# Patient Record
Sex: MALE | Race: WHITE | Employment: OTHER | URBAN - METROPOLITAN AREA
[De-identification: names, ages, dates, MRNs, and addresses within clinical notes are randomized per-mention and may not be internally consistent; named-entity substitution may affect disease eponyms.]

---

## 2017-07-24 RX ORDER — METOPROLOL SUCCINATE 25 MG/1
TABLET, EXTENDED RELEASE ORAL
Qty: 90 TAB | Refills: 2 | Status: SHIPPED | OUTPATIENT
Start: 2017-07-24 | End: 2018-04-20 | Stop reason: SDUPTHER

## 2017-07-24 RX ORDER — RAMIPRIL 2.5 MG/1
CAPSULE ORAL
Qty: 90 CAP | Refills: 2 | Status: SHIPPED | OUTPATIENT
Start: 2017-07-24 | End: 2018-04-20 | Stop reason: SDUPTHER

## 2017-08-29 ENCOUNTER — TELEPHONE (OUTPATIENT)
Dept: CARDIOLOGY CLINIC | Age: 62
End: 2017-08-29

## 2017-08-29 DIAGNOSIS — E78.5 DYSLIPIDEMIA: Primary | ICD-10-CM

## 2017-08-29 DIAGNOSIS — I25.10 ATHEROSCLEROSIS OF NATIVE CORONARY ARTERY OF NATIVE HEART WITHOUT ANGINA PECTORIS: ICD-10-CM

## 2017-08-29 NOTE — TELEPHONE ENCOUNTER
Patient and his wife are calling in requesting labs. They state they haven't done it this year with PCP or anyone.

## 2017-09-27 RX ORDER — EZETIMIBE 10 MG/1
10 TABLET ORAL DAILY
Qty: 90 TAB | Refills: 3 | Status: SHIPPED | OUTPATIENT
Start: 2017-09-27 | End: 2017-10-12 | Stop reason: SDUPTHER

## 2017-10-11 RX ORDER — SIMVASTATIN 40 MG/1
TABLET, FILM COATED ORAL
Qty: 90 TAB | Refills: 3 | Status: SHIPPED | OUTPATIENT
Start: 2017-10-11 | End: 2017-10-11 | Stop reason: CLARIF

## 2017-10-11 NOTE — LETTER
10/11/2017 5:26 PM 
 
David Leeanna Blanton [place] 89 Dear Mr. Cook, We have been unable to reach you by phone in order to refill your medications. Please call our office at [phone] and schedule appointment to see us.  
 
 
Sincerely, 
 
 
Leslie Aguayo, DO

## 2017-10-11 NOTE — TELEPHONE ENCOUNTER
[facility] prescription for Zocor. According to our records patient has allergy to this medication and has been on Lipitor and Zetia instead. Last appointment with Dr Sheridan Gamble note \"His latest lipid profile, which was completed on 6/17/16, showed a total cholesterol of 142 with triglycerides of 116, HDL of 56, VLDL of 23 and LDL of 63, which is fairly good control of his cholesterol, but still not optimal.  In view of his high calcium score, I think we should try to get his LDLs down into the 40s or 50s and therefore I am going to add Zetia 10 mg to his regimen and actually back off his Lipitor to 40 mg a day and then repeat his lipid profile in two months. \"    Patient should be on Zetia 10 mg and and Lipitor 40mg. No other adjustments since then in our office. Called in [facility] in order to figure out who initiated simvastatin refill. They said it was their mistake. They will d/c simvastatin. Their direct phone [phone].

## 2017-10-12 RX ORDER — ATORVASTATIN CALCIUM 40 MG/1
40 TABLET, FILM COATED ORAL DAILY
Qty: 90 TAB | Refills: 3 | Status: SHIPPED | OUTPATIENT
Start: 2017-10-12 | End: 2018-09-25 | Stop reason: SDUPTHER

## 2017-10-12 RX ORDER — EZETIMIBE 10 MG/1
10 TABLET ORAL DAILY
Qty: 90 TAB | Refills: 3 | Status: SHIPPED | OUTPATIENT
Start: 2017-10-12 | End: 2018-10-26 | Stop reason: SDUPTHER

## 2017-10-12 NOTE — TELEPHONE ENCOUNTER
PAtient called in. He received a text from Dr Bonnita Kussmaul yesterday. Patient has been taking Simvastatin 40 mg along with Zetia 10 mg all this time. Dr Liliana Triplett clearly states  \"   His latest lipid profile, which was completed on 6/17/16, showed a total cholesterol of 142 with triglycerides of 116, HDL of 56, VLDL of 23 and LDL of 63, which is fairly good control of his cholesterol, but still not optimal.  In view of his high calcium score, I think we should try to get his LDLs down into the 40s or 50s and therefore I am going to add Zetia 10 mg to his regimen and actually back off his Lipitor to 40 mg a day and then repeat his lipid profile in two months. \"       Advised patient to switch to Atorvastatin 40 mg when [facility] will [facility](will refill today), especially  In lieu of him having myalgias and being fatigued on it( we have documented allergy to zocor). He will do labs prior to seeing dr Bonnita Kussmaul.

## 2017-11-14 LAB
ALBUMIN SERPL-MCNC: 4.3 G/DL (ref 3.6–4.8)
ALP SERPL-CCNC: 120 IU/L (ref 39–117)
ALT SERPL-CCNC: 20 IU/L (ref 0–44)
AST SERPL-CCNC: 25 IU/L (ref 0–40)
BILIRUB DIRECT SERPL-MCNC: 0.08 MG/DL (ref 0–0.4)
BILIRUB SERPL-MCNC: 0.3 MG/DL (ref 0–1.2)
BUN SERPL-MCNC: 20 MG/DL (ref 8–27)
BUN/CREAT SERPL: 18 (ref 10–24)
CALCIUM SERPL-MCNC: 9.6 MG/DL (ref 8.6–10.2)
CHLORIDE SERPL-SCNC: 104 MMOL/L (ref 96–106)
CHOLEST SERPL-MCNC: 125 MG/DL (ref 100–199)
CO2 SERPL-SCNC: 25 MMOL/L (ref 18–29)
CREAT SERPL-MCNC: 1.09 MG/DL (ref 0.76–1.27)
GFR SERPLBLD CREATININE-BSD FMLA CKD-EPI: 73 ML/MIN/1.73
GFR SERPLBLD CREATININE-BSD FMLA CKD-EPI: 84 ML/MIN/1.73
GLUCOSE SERPL-MCNC: 102 MG/DL (ref 65–99)
HDLC SERPL-MCNC: 48 MG/DL
INTERPRETATION, 910389: NORMAL
LDLC SERPL CALC-MCNC: 61 MG/DL (ref 0–99)
POTASSIUM SERPL-SCNC: 5.8 MMOL/L (ref 3.5–5.2)
PROT SERPL-MCNC: 6.9 G/DL (ref 6–8.5)
SODIUM SERPL-SCNC: 145 MMOL/L (ref 134–144)
TRIGL SERPL-MCNC: 78 MG/DL (ref 0–149)
VLDLC SERPL CALC-MCNC: 16 MG/DL (ref 5–40)

## 2017-11-14 NOTE — PROGRESS NOTES
Per your last note \" His latest lipid profile, which was completed on 6/17/16, showed a total cholesterol of 142 with triglycerides of 116, HDL of 56, VLDL of 23 and LDL of 63, which is fairly good control of his cholesterol, but still not optimal.  In view of his high calcium score, I think we should try to get his LDLs down into the 40s or 50s and therefore I am going to add Zetia 10 mg to his regimen and actually back off his Lipitor to 40 mg a day and then repeat his lipid profile in two months. We will continue to adjust his lipid lowering medications moving forward to try to get his LDLs down into the 40 or 50 range, which I think would be optimal for the long term. I also asked him to start coenzyme Q10 200 mg a day just because he is on a statin and because we know that coenzyme Q10 is decreased by statin therapy to hopefully avoid development of any side effects moving forward, and I will simply plan to see him again in a year or as needed if new symptoms surface, or  obviously more quickly if he has a significantly abnormal nuclear myocardial perfusion study.    Please take a look at potassium , K is 5.8, patient is on altace

## 2017-11-15 NOTE — PROGRESS NOTES
This could, of course, be a hemo lysed specimen. I would repeat in the next couple of days and if still elevated then DC ACE. If patient can't get it repeated quickly then I would just have to stop the ACE now and repeat the BMP in a few weeks and assume the specimen was not hemo lysed and the hyperkalemia is indeed secondary to the ACE and alex his chart accordingly.  ES

## 2017-11-16 ENCOUNTER — TELEPHONE (OUTPATIENT)
Dept: CARDIOLOGY CLINIC | Age: 62
End: 2017-11-16

## 2017-11-16 DIAGNOSIS — E78.5 DYSLIPIDEMIA: Primary | ICD-10-CM

## 2017-11-16 DIAGNOSIS — I25.10 ATHEROSCLEROSIS OF NATIVE CORONARY ARTERY OF NATIVE HEART WITHOUT ANGINA PECTORIS: ICD-10-CM

## 2017-11-16 NOTE — TELEPHONE ENCOUNTER
----- Message from Bassam Barclay DO sent at 11/15/2017 12:38 PM EST -----  This could, of course, be a hemo lysed specimen. I would repeat in the next couple of days and if still elevated then DC ACE. If patient can't get it repeated quickly then I would just have to stop the ACE now and repeat the BMP in a few weeks and assume the specimen was not hemo lysed and the hyperkalemia is indeed secondary to the ACE and alex his chart accordingly.  ES

## 2017-11-21 ENCOUNTER — OFFICE VISIT (OUTPATIENT)
Dept: CARDIOLOGY CLINIC | Age: 62
End: 2017-11-21

## 2017-11-21 VITALS
WEIGHT: 169 LBS | HEIGHT: 70 IN | HEART RATE: 58 BPM | BODY MASS INDEX: 24.2 KG/M2 | OXYGEN SATURATION: 98 % | DIASTOLIC BLOOD PRESSURE: 78 MMHG | SYSTOLIC BLOOD PRESSURE: 118 MMHG

## 2017-11-21 DIAGNOSIS — I25.10 ATHEROSCLEROSIS OF NATIVE CORONARY ARTERY OF NATIVE HEART WITHOUT ANGINA PECTORIS: Primary | ICD-10-CM

## 2017-11-21 DIAGNOSIS — E78.5 DYSLIPIDEMIA: ICD-10-CM

## 2017-11-21 RX ORDER — SIMVASTATIN 40 MG/1
TABLET, FILM COATED ORAL
COMMUNITY
End: 2019-09-24 | Stop reason: ALTCHOICE

## 2017-11-21 NOTE — PROGRESS NOTES
1. Have you been to the ER, urgent care clinic since your last visit? Hospitalized since your last visit?no    2. Have you seen or consulted any other health care providers outside of the [address] [place] [address] since your last visit? Include any pap smears or colon screening.  no

## 2017-11-21 NOTE — PROGRESS NOTES
Review of Systems   Constitutional: Negative for chills, fever, malaise/fatigue and weight loss. Respiratory: Negative for cough, hemoptysis, shortness of breath and wheezing. Cardiovascular: Negative for chest pain, palpitations, orthopnea and leg swelling. Gastrointestinal: Negative. Musculoskeletal: Positive for falls and joint pain. Negative for myalgias. Neurological: Negative for dizziness.

## 2017-11-21 NOTE — MR AVS SNAPSHOT
Visit Information Date & Time Provider Department Dept. Phone Encounter #  
 11/21/2017  9:20 AM Dave Simon DO [facility] [address] [id number] Follow-up Instructions Return in about 1 year (around 11/21/2018), or if symptoms worsen or fail to improve. Upcoming Health Maintenance Date Due Hepatitis C Screening 1955 DTaP/Tdap/Td series (1 - Tdap) 4/16/1977 FOBT Q 1 YEAR AGE 50-75 4/16/2006 ZOSTER VACCINE AGE 60> 2/16/2016 Influenza Age 5 to Adult 8/1/2017 Allergies as of 11/21/2017  Review Complete On: 11/21/2017 By: Dave Simon DO Severity Noted Reaction Type Reactions Celebrex [Celecoxib]  04/10/2013    Rash Crestor [Rosuvastatin]  06/12/2013    Myalgia Zocor [Simvastatin]  06/12/2013    Myalgia Current Immunizations  Never Reviewed No immunizations on file. Not reviewed this visit You Were Diagnosed With   
  
 Codes Comments Atherosclerosis of native coronary artery of native heart without angina pectoris    -  Primary ICD-10-CM: I25.10 ICD-9-CM: 414.01 Dyslipidemia     ICD-10-CM: E78.5 ICD-9-CM: 272.4 Vitals BP Pulse Height(growth percentile) Weight(growth percentile) SpO2 BMI  
 118/78 (!) 58 5' 10\" (1.778 m) 169 lb (76.7 kg) 98% 24.25 kg/m2 Smoking Status Never Smoker Vitals History BMI and BSA Data Body Mass Index Body Surface Area  
 24.25 kg/m 2 1.95 m 2 Preferred Pharmacy Pharmacy Name Phone Danielle Bermudez [facility] [phone] Your Updated Medication List  
  
   
This list is accurate as of: 11/21/17 10:16 AM.  Always use your most recent med list.  
  
  
  
  
 aspirin 81 mg tablet Take 81 mg by mouth daily. atorvastatin 40 mg tablet Commonly known as:  LIPITOR Take 1 Tab by mouth daily. ezetimibe 10 mg tablet Commonly known as:  Haseeb Lamprey Take 1 Tab by mouth daily. metoprolol succinate 25 mg XL tablet Commonly known as:  TOPROL-XL  
TAKE 1 TABLET DAILY  
  
 ramipril 2.5 mg capsule Commonly known as:  ALTACE  
TAKE 1 CAPSULE DAILY ZOCOR 40 mg tablet Generic drug:  simvastatin Take  by mouth nightly. We Performed the Following AMB POC EKG ROUTINE W/ 12 LEADS, INTER & REP [13524 CPT(R)] Follow-up Instructions Return in about 1 year (around 11/21/2018), or if symptoms worsen or fail to improve. Introducing [facility]! Dear Neetu Pacheco: Thank you for requesting a [employer] account. Our records indicate that you already have an active [employer] account. You can access your account anytime at [email or web address] Did you know that you can access your hospital and ER discharge instructions at any time in [employer]? You can also review all of your test results from your hospital stay or ER visit. Additional Information If you have questions, please visit the Frequently Asked Questions section of the [employer] website at [email or web address] Remember, [employer] is NOT to be used for urgent needs. For medical emergencies, dial 911. Now available from your iPhone and Android! Please provide this summary of care documentation to your next provider. If you have any questions after today's visit, please call [phone].

## 2017-11-21 NOTE — PROGRESS NOTES
HPI: I saw Judy Sifuentes Joyce in my office today in cardiovascular evaluation regarding his known coronary artery disease and dyslipidemia. 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 He did have a cardiac catheterization back in April of 2013, which demonstrated mild coronary artery disease with 20-40% narrowings in the LAD and circumflex and 50% obstruction in the distal portion of the dominant right coronary artery. He had a coronary calcium score on July 11, 2016 which was 871 and certainly suggests that he has fairly aggressive atherosclerosis and we did this to see how aggressive we should be with statin therapy. When I saw him last here in the office on August 3, 2016 he was complaining of some fatigue and we ultimately did a nuclear myocardial perfusion study to be sure that there was not any signs of ischemia as the etiology of his fatigue which was completed on August 22, 2016 and appear to be completely within normal limits with normal perfusion of the heart without signs of ischemia or infarction and normal left trigger function with ejection fraction of 63% on gated SPECT imaging. He comes into the office today for the first time in over a year for cardiovascular evaluation prior to a planned shoulder arthroscopic procedure to be completed by Dr. Cecil Olivera on January 17, 2018. He relates to me that he still has some problems with fatigue, but he works for a long hours as an orthodontist and his fatigue issues are not worsening and I do not feel that they his fatigue is related to obstructive coronary artery disease. .    Encounter Diagnoses   Name Primary?  Atherosclerosis of native coronary artery of native heart without angina pectoris Yes    Dyslipidemia        Discussion:  This gentleman appears to be doing about as well as we could expect and I really have no recommendations for change at this time. He apparently fell out of a tree and traumatized his shoulder and broke 5 ribs in the middle of October and now is having a lot more problems with his chronic shoulder issues for which surgery is being contemplated as indicated above and I do not feel he should be at any significant increased cardiovascular risk at the time of that planned surgery. His lipid profile which was completed on October 13, 2017 showed total cholesterol 125 with triglycerides of 78, HDL of 48, VLDL of 16, and LDL of 61 which I think is good control on his current Lipitor 80 mg daily and I would simply recommend continuing the Lipitor as she is currently taking it. His blood pressure is well-controlled today and his EKG is stable so I am simply going to plan to see him again in several months to year or as needed if any new cardiovascular symptoms surface in the interim. PCP:  No primary care provider on file. Past Medical History:   Diagnosis Date    Abnormal stress echo 04/24/2013    Abnormal maximal stress echo. Echo findings positive for stress-induced ischemia, suggesting CAD in the LAD. EF 65%. Mod apical hypk. Neg EKG. Ex time 13 min.  Agatston CAC score, >400 07/11/2016    Coronary calcium score 615.  Aorto-iliac duplex 05/24/2013    No AAA identified. No visible plaque noted.  Dyslipidemia     S/P cardiac cath 06/28/2013    dRCA 50%. o/pPDA 50%. LM patent. CX 25%. pLAD 35%. LVEDP 14.  EF 65%. No WMA.          Past Surgical History:   Procedure Laterality Date    HX ACL RECONSTRUCTION      HX FREE SKIN GRAFT  3/2013    basal cell carcinoma    HX HEART CATHETERIZATION  6/28/2013       Current Outpatient Rx   Name  Route  Sig  Dispense  Refill    atorvastatin (LIPITOR) 80 mg tablet        TAKE 1 TABLET DAILY    90 Tab    2      metoprolol succinate (TOPROL-XL) 25 mg XL tablet        TAKE 1 TABLET BY MOUTH DAILY    90 Tab    3      ramipril (ALTACE) 2.5 mg capsule        TAKE ONE CAPSULE BY MOUTH DAILY    90 Cap    3      aspirin 81 mg tablet    Oral    Take 81 mg by mouth daily. Allergies   Allergen Reactions    Celebrex [Celecoxib] Rash    Crestor [Rosuvastatin] Myalgia    Zocor [Simvastatin] Myalgia       Social History :  Social History   Substance Use Topics    Smoking status: Never Smoker    Smokeless tobacco: Former User     Quit date: 1/1/1985    Alcohol use Yes      Comment: occassionally        Family History: family history includes Coronary Artery Disease in his maternal uncle; Heart Attack in his father and maternal uncle; Heart Surgery in his maternal uncle; Other in his mother. Review of Systems:   Constitutional: Negative for chills, fever, malaise/fatigue and weight loss. Respiratory: Negative for cough, hemoptysis, shortness of breath and wheezing. Cardiovascular: Negative for chest pain, palpitations, orthopnea and leg swelling. Gastrointestinal: Negative. Musculoskeletal: Positive for falls and joint pain. Negative for myalgias. Neurological: Negative for dizziness. Physical Exam:    The patient is a cooperative, alert, well developed, well nourished 65 y.o.  male who is in no acute distress at the time of the examination. Visit Vitals    /78    Pulse (!) 58    Ht 5' 10\" (1.778 m)    Wt 76.7 kg (169 lb)    SpO2 98%    BMI 24.25 kg/m2     HEENT: Conjuctiva white, mucosa moist, no pallor or cyanosis. NECK: Supple without masses, tenderness or thyromegaly. There was no jugular venous distention. Carotid are full bilaterally without bruits. CHEST: Symmetrical with good excursion. LUNGS: Clear to auscultation in all fields. HEART: The apex is not displaced. There were no lifts, thrills or heaves. There is a normal S1 and S2 without appreciable murmurs, rubs, clicks, or gallops auscultated.   ABDOMEN: Soft without masses, tenderness or organomegaly. EXTREMITIES: Full peripheral pulses without peripheral edema. Review of Data: See PMH and Cardiology and Imaging sections for cardiac testing  Lab Results   Component Value Date/Time    Cholesterol, total 125 11/13/2017 08:19 AM    HDL Cholesterol 48 11/13/2017 08:19 AM    LDL, calculated 61 11/13/2017 08:19 AM    Triglyceride 78 11/13/2017 08:19 AM    CHOL/HDL Ratio 3.8 09/20/2010 10:34 AM       Results for orders placed or performed in visit on 11/21/17   AMB POC EKG ROUTINE W/ 12 LEADS, INTER & REP     Status: None    Narrative    Sinus bradycardia, rate 58. This EKG is within normal limits and similar to the EKG of August 3, 2016. Brandon Wright D.O., F.A.C.C. [facility]  [facility] [place]  [facility] [address]    PLEASE NOTE:  This document has been produced using voice recognition software. Unrecognized errors in transcription may be present.

## 2017-11-22 LAB
ALBUMIN SERPL-MCNC: 4.2 G/DL (ref 3.6–4.8)
ALP SERPL-CCNC: 99 IU/L (ref 39–117)
ALT SERPL-CCNC: 26 IU/L (ref 0–44)
AST SERPL-CCNC: 30 IU/L (ref 0–40)
BILIRUB DIRECT SERPL-MCNC: 0.1 MG/DL (ref 0–0.4)
BILIRUB SERPL-MCNC: 0.3 MG/DL (ref 0–1.2)
BUN SERPL-MCNC: 19 MG/DL (ref 8–27)
BUN/CREAT SERPL: 19 (ref 10–24)
CALCIUM SERPL-MCNC: 9.6 MG/DL (ref 8.6–10.2)
CHLORIDE SERPL-SCNC: 102 MMOL/L (ref 96–106)
CHOLEST SERPL-MCNC: 149 MG/DL (ref 100–199)
CO2 SERPL-SCNC: 25 MMOL/L (ref 18–29)
CREAT SERPL-MCNC: 1.02 MG/DL (ref 0.76–1.27)
GFR SERPLBLD CREATININE-BSD FMLA CKD-EPI: 79 ML/MIN/1.73
GFR SERPLBLD CREATININE-BSD FMLA CKD-EPI: 91 ML/MIN/1.73
GLUCOSE SERPL-MCNC: 91 MG/DL (ref 65–99)
HDLC SERPL-MCNC: 43 MG/DL
INTERPRETATION, 910389: NORMAL
LDLC SERPL CALC-MCNC: 58 MG/DL (ref 0–99)
POTASSIUM SERPL-SCNC: 5.1 MMOL/L (ref 3.5–5.2)
PROT SERPL-MCNC: 6.6 G/DL (ref 6–8.5)
SODIUM SERPL-SCNC: 142 MMOL/L (ref 134–144)
TRIGL SERPL-MCNC: 242 MG/DL (ref 0–149)
VLDLC SERPL CALC-MCNC: 48 MG/DL (ref 5–40)

## 2017-11-29 NOTE — PROGRESS NOTES
I am not sure I ordered a BMP, but may have ordered the lipid profile which is somewhat abnormal and certainly a lot worse than 2 weeks ago which does not really make any sense so I question if this was a correct sample since the one 2 weeks ago looked fine. I really do not think there is anything to do unless there is something else in my note that suggest that I was going to make any changes.  ES

## 2018-04-20 RX ORDER — RAMIPRIL 2.5 MG/1
CAPSULE ORAL
Qty: 90 CAP | Refills: 2 | Status: SHIPPED | OUTPATIENT
Start: 2018-04-20 | End: 2018-10-26 | Stop reason: SDUPTHER

## 2018-04-20 RX ORDER — METOPROLOL SUCCINATE 25 MG/1
TABLET, EXTENDED RELEASE ORAL
Qty: 90 TAB | Refills: 2 | Status: SHIPPED | OUTPATIENT
Start: 2018-04-20 | End: 2018-10-26 | Stop reason: SDUPTHER

## 2018-09-26 RX ORDER — ATORVASTATIN CALCIUM 40 MG/1
40 TABLET, FILM COATED ORAL DAILY
Qty: 90 TAB | Refills: 3 | Status: SHIPPED | OUTPATIENT
Start: 2018-09-26 | End: 2019-09-23 | Stop reason: SDUPTHER

## 2018-10-27 RX ORDER — RAMIPRIL 2.5 MG/1
CAPSULE ORAL
Qty: 90 CAP | Refills: 2 | Status: SHIPPED | OUTPATIENT
Start: 2018-10-27 | End: 2019-11-25 | Stop reason: SDUPTHER

## 2018-10-27 RX ORDER — METOPROLOL SUCCINATE 25 MG/1
TABLET, EXTENDED RELEASE ORAL
Qty: 90 TAB | Refills: 2 | Status: SHIPPED | OUTPATIENT
Start: 2018-10-27 | End: 2019-09-23 | Stop reason: SDUPTHER

## 2018-10-27 RX ORDER — EZETIMIBE 10 MG/1
10 TABLET ORAL DAILY
Qty: 90 TAB | Refills: 3 | Status: SHIPPED | OUTPATIENT
Start: 2018-10-27 | End: 2019-05-21 | Stop reason: SDUPTHER

## 2019-03-06 ENCOUNTER — OFFICE VISIT (OUTPATIENT)
Dept: CARDIOLOGY CLINIC | Age: 64
End: 2019-03-06

## 2019-03-06 VITALS
OXYGEN SATURATION: 96 % | HEIGHT: 70 IN | WEIGHT: 171 LBS | HEART RATE: 65 BPM | DIASTOLIC BLOOD PRESSURE: 78 MMHG | BODY MASS INDEX: 24.48 KG/M2 | SYSTOLIC BLOOD PRESSURE: 116 MMHG

## 2019-03-06 DIAGNOSIS — E78.5 DYSLIPIDEMIA: Primary | ICD-10-CM

## 2019-03-06 DIAGNOSIS — I25.10 ATHEROSCLEROSIS OF NATIVE CORONARY ARTERY OF NATIVE HEART WITHOUT ANGINA PECTORIS: ICD-10-CM

## 2019-03-06 NOTE — PROGRESS NOTES
HPI:  I saw Yanet Cook in my office today in cardiovascular evaluation regarding his known coronary artery disease and dyslipidemia.  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 Terrie Suarez did have a cardiac catheterization back in 2013, which demonstrated mild coronary artery disease with 20-40% narrowings in the LAD and circumflex and 50% obstruction in the distal portion of the dominant right coronary artery.  He had a coronary calcium score on 2016 which was 325 and certainly suggests that he has fairly aggressive atherosclerosis and we did this to see how aggressive we should be with statin therapy.     His last nuclear myocardial perfusion study which was done to be sure that there were not any signs of ischemia as the etiology of his fatigue was completed on 2016 and appear to be completely within normal limits with normal perfusion of the heart without signs of ischemia or infarction and normal left trigger function with ejection fraction of 63% on gated SPECT imaging.     He comes in today and relates that he is doing quite well. He is staying fairly active and denies any cardiovascular symptomatology at this time. Encounter Diagnoses   Name Primary?  Atherosclerosis of native coronary artery of native heart without angina pectoris     Dyslipidemia Yes       Discussion: This patient appears to be doing about as well as we could expect and I really have no recommendations for change at this time. He is not having any symptoms to suggest the development of symptomatic obstructive coronary disease or any signs of heart failure.     I do not have a recent lipid profile on him which I am going to get completed but historically his total non-HDL cholesterol has been a little over 100 when he was on Lipitor and hopefully will be better on a combination of Lipitor and Zetia. His blood pressure is well controlled today and his EKG appears to be stable and since he otherwise seems to be doing well from my advantage I will simply plan to see him again in a year. PCP:  Katya Glass MD      Past Medical History:   Diagnosis Date    Abnormal stress echo 04/24/2013    Abnormal maximal stress echo. Echo findings positive for stress-induced ischemia, suggesting CAD in the LAD. EF 65%. Mod apical hypk. Neg EKG. Ex time 13 min.  Agatston CAC score, >400 07/11/2016    Coronary calcium score 615.  Aorto-iliac duplex 05/24/2013    No AAA identified. No visible plaque noted.  Dyslipidemia     S/P cardiac cath 06/28/2013    dRCA 50%. o/pPDA 50%. LM patent. CX 25%. pLAD 35%. LVEDP 14.  EF 65%. No WMA. Past Surgical History:   Procedure Laterality Date    HX ACL RECONSTRUCTION      HX FREE SKIN GRAFT  3/2013    basal cell carcinoma    HX HEART CATHETERIZATION  6/28/2013       Current Outpatient Medications   Medication Sig    metoprolol succinate (TOPROL-XL) 25 mg XL tablet TAKE 1 TABLET DAILY    ezetimibe (ZETIA) 10 mg tablet Take 1 Tab by mouth daily.  ramipril (ALTACE) 2.5 mg capsule TAKE 1 CAPSULE DAILY    atorvastatin (LIPITOR) 40 mg tablet Take 1 Tab by mouth daily.  simvastatin (ZOCOR) 40 mg tablet Take  by mouth nightly.  aspirin 81 mg tablet Take 81 mg by mouth daily. No current facility-administered medications for this visit. Allergies   Allergen Reactions    Celebrex [Celecoxib] Rash    Crestor [Rosuvastatin] Myalgia    Zocor [Simvastatin] Myalgia       Social History :  Social History     Tobacco Use    Smoking status: Never Smoker    Smokeless tobacco: Former User   Substance Use Topics    Alcohol use: Yes     Comment: occassionally        Family History: family history includes Coronary Artery Disease in his maternal uncle;  Heart Attack in his father and maternal uncle; Heart Surgery in his maternal uncle; Other in his mother. Review of Systems:   Constitutional: Negative. Respiratory: Negative. Cardiovascular: Negative. Gastrointestinal: Negative. Musculoskeletal: Positive for joint pain. Negative for falls and myalgias. Neurological: Negative for dizziness. Physical Exam:    The patient is a cooperative, alert, well developed, well nourished 59 y.o.  male who is in no acute distress at the time of the examination. Visit Vitals  /78   Pulse 65   Ht 5' 10\" (1.778 m)   Wt 77.6 kg (171 lb)   SpO2 96%   BMI 24.54 kg/m²     HEENT: Conjuctiva white, mucosa moist, no pallor or cyanosis. NECK: Supple without masses, tenderness or thyromegaly. There was no jugular venous distention. Carotid are full bilaterally without bruits. CHEST: Symmetrical with good excursion. LUNGS: Clear to auscultation in all fields. HEART: The apex is not displaced. There were no lifts, thrills or heaves. There is a normal S1 and S2 without appreciable murmurs, rubs, clicks, or gallops auscultated. ABDOMEN: Soft without masses, tenderness or organomegaly. EXTREMITIES: Full peripheral pulses without peripheral edema. Review of Data: See PMH and Cardiology and Imaging sections for cardiac testing  Lab Results   Component Value Date/Time    Cholesterol, total 149 11/21/2017 08:34 AM    HDL Cholesterol 43 11/21/2017 08:34 AM    LDL, calculated 58 11/21/2017 08:34 AM    Triglyceride 242 (H) 11/21/2017 08:34 AM    CHOL/HDL Ratio 3.8 09/20/2010 10:34 AM       Results for orders placed or performed in visit on 03/06/19   AMB POC EKG ROUTINE W/ 12 LEADS, INTER & REP     Status: None    Narrative    Normal sinus rhythm rate 65. This EKG is within normal limits and similar to the EKG of November 21, 2017. Maricruz Dumont D.O., F.A.C.C. [facility]  [facility] [place]  [place] [address].   [address]    PLEASE NOTE:  This document has been produced using voice recognition software. Unrecognized errors in transcription may be present. Review of Systems   Constitutional: Negative. Respiratory: Negative. Cardiovascular: Negative. Gastrointestinal: Negative. Musculoskeletal: Positive for joint pain. Negative for falls and myalgias. Neurological: Negative for dizziness.

## 2019-03-14 LAB
ALBUMIN SERPL-MCNC: 4.4 G/DL (ref 3.6–4.8)
ALP SERPL-CCNC: 59 IU/L (ref 39–117)
ALT SERPL-CCNC: 34 IU/L (ref 0–44)
AST SERPL-CCNC: 30 IU/L (ref 0–40)
BILIRUB DIRECT SERPL-MCNC: 0.11 MG/DL (ref 0–0.4)
BILIRUB SERPL-MCNC: 0.3 MG/DL (ref 0–1.2)
CHOLEST SERPL-MCNC: 142 MG/DL (ref 100–199)
HDLC SERPL-MCNC: 48 MG/DL
INTERPRETATION, 910389: NORMAL
LDLC SERPL CALC-MCNC: 70 MG/DL (ref 0–99)
PROT SERPL-MCNC: 7 G/DL (ref 6–8.5)
SPECIMEN STATUS REPORT, ROLRST: NORMAL
TRIGL SERPL-MCNC: 118 MG/DL (ref 0–149)
VLDLC SERPL CALC-MCNC: 24 MG/DL (ref 5–40)

## 2019-03-18 NOTE — PROGRESS NOTES
This gentlemen's total cholesterol at 142 looks good and his LDL is 70. It is up slightly from last year but as long as it stays 70 or less I would continue him on his present medical regimen which I believe include Lipitor 40 mg daily and Zetia 10 mg daily. Just to be sure he is taking his medications consistently. Please let the patient know.  ES

## 2019-04-04 ENCOUNTER — TELEPHONE (OUTPATIENT)
Dept: CARDIOLOGY CLINIC | Age: 64
End: 2019-04-04

## 2019-04-04 NOTE — LETTER
4/15/2019 Mr. [address] [facility] [facility] [address] Dear  Joyce, We have been unable to reach you by phone to notify you of your test results. Please call our office at [phone] and ask to speak with my nurse in order to explain these results to you and advise you of any recommendations.  
 
 
 
Sincerely, 
 
 
Bassam Barclay, DO

## 2019-04-04 NOTE — TELEPHONE ENCOUNTER
----- Message from Say Boone DO sent at 3/18/2019 10:07 AM EDT ----- This gentlemen's total cholesterol at 142 looks good and his LDL is 70. It is up slightly from last year but as long as it stays 70 or less I would continue him on his present medical regimen which I believe include Lipitor 40 mg daily and Zetia 10 mg daily. Just to be sure he is taking his medications consistently. Please let the patient know.  ES

## 2019-04-26 ENCOUNTER — TELEPHONE (OUTPATIENT)
Dept: CARDIOLOGY CLINIC | Age: 64
End: 2019-04-26

## 2019-05-21 RX ORDER — EZETIMIBE 10 MG/1
10 TABLET ORAL DAILY
Qty: 90 TAB | Refills: 3 | Status: SHIPPED | OUTPATIENT
Start: 2019-05-21 | End: 2020-03-09 | Stop reason: SDUPTHER

## 2019-09-24 RX ORDER — ATORVASTATIN CALCIUM 40 MG/1
TABLET, FILM COATED ORAL
Qty: 90 TAB | Refills: 1 | Status: SHIPPED | OUTPATIENT
Start: 2019-09-24 | End: 2020-03-09 | Stop reason: SDUPTHER

## 2019-09-24 RX ORDER — METOPROLOL SUCCINATE 25 MG/1
TABLET, EXTENDED RELEASE ORAL
Qty: 90 TAB | Refills: 1 | Status: SHIPPED | OUTPATIENT
Start: 2019-09-24 | End: 2020-03-09 | Stop reason: SDUPTHER

## 2019-11-25 RX ORDER — RAMIPRIL 2.5 MG/1
CAPSULE ORAL
Qty: 90 CAP | Refills: 3 | Status: SHIPPED | OUTPATIENT
Start: 2019-11-25 | End: 2020-03-09 | Stop reason: SDUPTHER

## 2020-03-09 ENCOUNTER — OFFICE VISIT (OUTPATIENT)
Dept: CARDIOLOGY CLINIC | Age: 65
End: 2020-03-09

## 2020-03-09 VITALS
DIASTOLIC BLOOD PRESSURE: 78 MMHG | HEART RATE: 67 BPM | HEIGHT: 70 IN | BODY MASS INDEX: 24.62 KG/M2 | SYSTOLIC BLOOD PRESSURE: 124 MMHG | OXYGEN SATURATION: 96 % | WEIGHT: 172 LBS

## 2020-03-09 DIAGNOSIS — R53.83 FATIGUE, UNSPECIFIED TYPE: Primary | ICD-10-CM

## 2020-03-09 DIAGNOSIS — I25.10 ATHEROSCLEROSIS OF NATIVE CORONARY ARTERY OF NATIVE HEART WITHOUT ANGINA PECTORIS: ICD-10-CM

## 2020-03-09 DIAGNOSIS — E78.5 DYSLIPIDEMIA: ICD-10-CM

## 2020-03-09 LAB — Lab: NORMAL

## 2020-03-09 RX ORDER — RAMIPRIL 2.5 MG/1
CAPSULE ORAL
Qty: 90 CAP | Refills: 3 | Status: SHIPPED | OUTPATIENT
Start: 2020-03-09 | End: 2021-01-12

## 2020-03-09 RX ORDER — EZETIMIBE 10 MG/1
10 TABLET ORAL DAILY
Qty: 90 TAB | Refills: 3 | Status: SHIPPED | OUTPATIENT
Start: 2020-03-09 | End: 2021-08-30 | Stop reason: SDUPTHER

## 2020-03-09 RX ORDER — ATORVASTATIN CALCIUM 40 MG/1
TABLET, FILM COATED ORAL
Qty: 90 TAB | Refills: 3 | Status: SHIPPED | OUTPATIENT
Start: 2020-03-09 | End: 2020-03-24

## 2020-03-09 RX ORDER — METOPROLOL SUCCINATE 25 MG/1
TABLET, EXTENDED RELEASE ORAL
Qty: 90 TAB | Refills: 3 | Status: SHIPPED | OUTPATIENT
Start: 2020-03-09 | End: 2020-03-24

## 2020-03-09 NOTE — PATIENT INSTRUCTIONS
If you have not heard from the central scheduler to schedule your testing in 48 hours, please call [phone].    
 
Hold Toprol the day of test.

## 2020-03-09 NOTE — PROGRESS NOTES
HPI:  I saw Елена Cook in my office today in cardiovascular evaluation regarding his known coronary artery disease and dyslipidemia.  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 [facility] did have a cardiac catheterization back in April of 2013, which demonstrated mild coronary artery disease with 20-40% narrowings in the LAD and circumflex and 50% obstruction in the distal portion of the dominant right coronary artery.  He had a coronary calcium score on July 11, 3720 LFJWW was 993 and certainly suggests that he has fairly aggressive atherosclerosis and we did this to see how aggressive we should be with statin therapy.     His last nuclear myocardial perfusion study which was done to be sure that there were not any signs of ischemia as the etiology of his fatigue was completed on August 22, 2016 and appear to be completely within normal limits with normal perfusion of the heart without signs of ischemia or infarction and normal left trigger function with ejection fraction of 63% on gated SPECT imaging.     He comes in today relates that he is doing quite well. Although in discussing his overall functional capacity with both he and his wife he has noted significant fatigue issues over the past year or more    Encounter Diagnoses   Name Primary?  Fatigue, possible anginal equivalent Yes    Atherosclerosis of native coronary artery of native heart without angina pectoris     Dyslipidemia        Discussion: This gentleman appears to be doing fairly well, but has been having a lot of fatigue as documented by his wife over the past several months to a year and certainly this could be an anginal equivalent. Consequently, I am going to get a stress nuclear myocardial perfusion study to rule out ischemia.       His latest lipid profile which have a copy of was done on March 13, 2019 showed total cholesterol 142 with triglycerides 118, HDL of 48, LDL of 70, and VLDL of 24 which I think is fairly good control and apparently had another lipid profile done recently through Dr. Sandoval Me office which we are going to obtain. Patient is continuing to take Lipitor 40 mg daily and Zetia 10 mg daily for control of his cholesterol. His blood pressure is well controlled today and he otherwise seems to be doing well with stable appearing electrocardiogram some simply get a plan to see him again in several months. PCP:  Sarika Bhagat MD      Past Medical History:   Diagnosis Date    Abnormal stress echo 04/24/2013    Abnormal maximal stress echo. Echo findings positive for stress-induced ischemia, suggesting CAD in the LAD. EF 65%. Mod apical hypk. Neg EKG. Ex time 13 min.  Agatston CAC score, >400 07/11/2016    Coronary calcium score 615.  Aorto-iliac duplex 05/24/2013    No AAA identified. No visible plaque noted.  Dyslipidemia     S/P cardiac cath 06/28/2013    dRCA 50%. o/pPDA 50%. LM patent. CX 25%. pLAD 35%. LVEDP 14.  EF 65%. No WMA. Past Surgical History:   Procedure Laterality Date    HX ACL RECONSTRUCTION      HX FREE SKIN GRAFT  3/2013    basal cell carcinoma    HX HEART CATHETERIZATION  6/28/2013       Current Outpatient Medications   Medication Sig    ramipril (ALTACE) 2.5 mg capsule TAKE 1 CAPSULE DAILY    metoprolol succinate (TOPROL-XL) 25 mg XL tablet TAKE 1 TABLET DAILY    atorvastatin (LIPITOR) 40 mg tablet TAKE 1 TABLET DAILY    ezetimibe (ZETIA) 10 mg tablet Take 1 Tab by mouth daily.  aspirin 81 mg tablet Take 81 mg by mouth daily. No current facility-administered medications for this visit.           Allergies   Allergen Reactions    Celebrex [Celecoxib] Rash    Crestor [Rosuvastatin] Myalgia    Zocor [Simvastatin] Myalgia       Social History :  Social History     Tobacco Use    Smoking status: Never Smoker    Smokeless tobacco: Former User   Substance Use Topics    Alcohol use: Yes     Comment: occassionally        Family History: family history includes Coronary Artery Disease in his maternal uncle; Heart Attack in his father and maternal uncle; Heart Surgery in his maternal uncle; Other in his mother. Review of Systems:   Constitutional: Negative. Respiratory: Positive for cough. Negative for hemoptysis, shortness of breath and wheezing. Cardiovascular: Negative. Gastrointestinal: Positive for heartburn. Negative for abdominal pain, blood in stool, constipation, diarrhea, melena, nausea and vomiting. Musculoskeletal: Positive for joint pain. Negative for falls and myalgias. Neurological: Negative for dizziness. Physical Exam:    The patient is a cooperative, alert, well developed, well nourished 62 y.o.  male who is in no acute distress at the time of the examination. Visit Vitals  /78 (BP 1 Location: Left arm, BP Patient Position: Sitting)   Pulse 67   Ht 5' 10\" (1.778 m)   Wt 78 kg (172 lb)   SpO2 96%   BMI 24.68 kg/m²     HEENT: Conjuctiva white, mucosa moist, no pallor or cyanosis. NECK: Supple without masses, tenderness or thyromegaly. There was no jugular venous distention. Carotid are full bilaterally without bruits. CHEST: Symmetrical with good excursion. LUNGS: Clear to auscultation in all fields. HEART: The apex is not displaced. There were no lifts, thrills or heaves. There is a normal S1 and S2 without appreciable murmurs, rubs, clicks, or gallops auscultated. ABDOMEN: Soft without masses, tenderness or organomegaly. EXTREMITIES: Full peripheral pulses without peripheral edema.     Review of Data: See PMH and Cardiology and Imaging sections for cardiac testing  Lab Results   Component Value Date/Time    Cholesterol, total 142 03/13/2019 12:00 AM    HDL Cholesterol 48 03/13/2019 12:00 AM    LDL, calculated 70 03/13/2019 12:00 AM    Triglyceride 118 03/13/2019 12:00 AM    CHOL/HDL Ratio 3.8 09/20/2010 10:34 AM       Results for orders placed or performed in visit on 03/09/20   AMB POC EKG ROUTINE W/ 12 LEADS, INTER & REP     Status: None   Result Value Ref Range Status    . Narrative    Normal sinus rhythm, rate 67. This EKG is within normal limits and similar to the EKG of March 6, 2019. Franca Willson D.O., F.A.C.C. [facility]  [facility] [place]  [address] [place] [address]. [address]    PLEASE NOTE:  This document has been produced using voice recognition software. Unrecognized errors in transcription may be present.

## 2020-03-24 RX ORDER — ATORVASTATIN CALCIUM 40 MG/1
TABLET, FILM COATED ORAL
Qty: 90 TAB | Refills: 3 | Status: SHIPPED | OUTPATIENT
Start: 2020-03-24 | End: 2021-01-12

## 2020-03-24 RX ORDER — METOPROLOL SUCCINATE 25 MG/1
TABLET, EXTENDED RELEASE ORAL
Qty: 90 TAB | Refills: 3 | Status: SHIPPED | OUTPATIENT
Start: 2020-03-24 | End: 2022-09-22

## 2020-11-10 ENCOUNTER — TELEPHONE (OUTPATIENT)
Dept: CARDIOLOGY CLINIC | Age: 65
End: 2020-11-10

## 2020-11-10 DIAGNOSIS — R53.83 FATIGUE, UNSPECIFIED TYPE: ICD-10-CM

## 2020-11-10 DIAGNOSIS — R53.83 FATIGUE, UNSPECIFIED TYPE: Primary | ICD-10-CM

## 2020-11-23 LAB
STRESS BASELINE DIAS BP: 80 MMHG
STRESS BASELINE HR: 72 BPM
STRESS BASELINE SYS BP: 122 MMHG
STRESS PEAK DIAS BP: 80 MMHG
STRESS PEAK SYS BP: 140 MMHG
STRESS PERCENT HR ACHIEVED: 63 %
STRESS POST PEAK HR: 98 BPM
STRESS RATE PRESSURE PRODUCT: NORMAL BPM*MMHG
STRESS ST DEPRESSION: 0 MM
STRESS ST ELEVATION: 0 MM
STRESS STAGE 1 BP: NORMAL MMHG
STRESS STAGE 1 DURATION: 3 MIN:SEC
STRESS STAGE 1 HR: 98 BPM
STRESS STAGE RECOVERY 1 BP: NORMAL MMHG
STRESS STAGE RECOVERY 1 DURATION: 1 MIN:SEC
STRESS STAGE RECOVERY 1 HR: 82 BPM
STRESS TARGET HR: 156 BPM

## 2020-11-25 ENCOUNTER — TELEPHONE (OUTPATIENT)
Dept: CARDIOLOGY CLINIC | Age: 65
End: 2020-11-25

## 2020-11-25 NOTE — TELEPHONE ENCOUNTER
----- Message from Mikayla Palafox RN sent at 11/25/2020  6:47 AM EST ----- Per your last note \"Note Received call from patient's spouse regarding scheduling stress test. Stress test was ordered at 3/9/2020 office visit. Test not done due to COVID concerns. Patient is still having fatigue. Discussed with Dr. Alis Funes. Verbal order and read back per Clayton Mccarty, DO 
- order nuclear stress test - fatigue; r/o ischemia 
- schedule follow up appointment in January with Dr. Cynthia Henry This has been fully explained to the patient's spouse, who indicates understanding.

## 2020-11-25 NOTE — TELEPHONE ENCOUNTER
Called and talked to the patient's wife since he was still sleeping and told her that the patient's nuclear myocardial perfusion study was completely normal with normal perfusion of the heart and normal left ventricular systolic function. I recommended that he follow-up with his family physician with regard to his fatigue issues and if his fatigue issues worsened and no other source was found to let us know and we would reevaluate him since 1 time out of 10 a nuclear myocardial perfusion study may be falsely normal when in fact the patient actually does have some symptomatic obstructive coronary artery disease.  ES

## 2020-11-25 NOTE — PROGRESS NOTES
Per your last note \"Note       Received call from patient's spouse regarding scheduling stress test. Stress test was ordered at 3/9/2020 office visit. Test not done due to COVID concerns. Patient is still having fatigue. Discussed with Dr. Pro Weiss. Verbal order and read back per Rosa Mora, DO  - order nuclear stress test - fatigue; r/o ischemia  - schedule follow up appointment in January with Dr. Gutiérrez Fear  This has been fully explained to the patient's spouse, who indicates understanding.

## 2021-01-05 ENCOUNTER — OFFICE VISIT (OUTPATIENT)
Dept: CARDIOLOGY CLINIC | Age: 66
End: 2021-01-05
Payer: COMMERCIAL

## 2021-01-05 VITALS
BODY MASS INDEX: 23.34 KG/M2 | WEIGHT: 163 LBS | SYSTOLIC BLOOD PRESSURE: 122 MMHG | OXYGEN SATURATION: 99 % | DIASTOLIC BLOOD PRESSURE: 84 MMHG | HEIGHT: 70 IN | HEART RATE: 84 BPM

## 2021-01-05 DIAGNOSIS — E78.5 DYSLIPIDEMIA: ICD-10-CM

## 2021-01-05 DIAGNOSIS — I25.10 CORONARY ARTERY DISEASE INVOLVING NATIVE CORONARY ARTERY OF NATIVE HEART WITHOUT ANGINA PECTORIS: Primary | ICD-10-CM

## 2021-01-05 DIAGNOSIS — Z82.49 FAMILY HISTORY OF PREMATURE CAD: ICD-10-CM

## 2021-01-05 DIAGNOSIS — R03.0 BORDERLINE HYPERTENSION: ICD-10-CM

## 2021-01-05 PROCEDURE — 93000 ELECTROCARDIOGRAM COMPLETE: CPT | Performed by: INTERNAL MEDICINE

## 2021-01-05 PROCEDURE — 99214 OFFICE O/P EST MOD 30 MIN: CPT | Performed by: INTERNAL MEDICINE

## 2021-01-05 RX ORDER — CYCLOBENZAPRINE HCL 10 MG
10 TABLET ORAL
COMMUNITY
Start: 2020-11-30 | End: 2022-09-22

## 2021-01-05 NOTE — PROGRESS NOTES
Dominga Bunch presents today for   Chief Complaint   Patient presents with    Follow-up     Bakari Mascorro Patient, 10 Month Follow up       Dominga Bunch preferred language for health care discussion is english/other. Is someone accompanying this pt? yes    Is the patient using any DME equipment during [address]? no    Depression Screening:  3 most recent PHQ Screens 1/5/2021   Little interest or pleasure in doing things Not at all   Feeling down, depressed, irritable, or hopeless Not at all   Total Score PHQ 2 0       Learning Assessment:  Learning Assessment 1/5/2021   PRIMARY LEARNER Patient   HIGHEST LEVEL OF EDUCATION - PRIMARY LEARNER  -   BARRIERS PRIMARY LEARNER -   CO-LEARNER CAREGIVER -   PRIMARY LANGUAGE ENGLISH   LEARNER PREFERENCE PRIMARY DEMONSTRATION   ANSWERED BY patient   RELATIONSHIP SELF       Abuse Screening:  Abuse Screening Questionnaire 1/5/2021   Do you ever feel afraid of your partner? N   Are you in a relationship with someone who physically or mentally threatens you? N   Is it safe for you to go home? Y       Fall Risk  Fall Risk Assessment, last 12 mths 1/5/2021   Able to walk? Yes   Fall in past 12 months? 0   Do you feel unsteady? 0   Are you worried about falling 0       Pt currently taking Anticoagulant therapy? no    Coordination of Care:  1. Have you been to the ER, urgent care clinic since your last visit? Hospitalized since your last visit? no    2. Have you seen or consulted any other health care providers outside of the [address] [place] [address] since your last visit? Include any pap smears or colon screening.  no

## 2021-01-12 RX ORDER — ATORVASTATIN CALCIUM 40 MG/1
TABLET, FILM COATED ORAL
Qty: 90 TAB | Refills: 3 | Status: SHIPPED | OUTPATIENT
Start: 2021-01-12 | End: 2022-10-14 | Stop reason: SDUPTHER

## 2021-01-12 RX ORDER — RAMIPRIL 2.5 MG/1
CAPSULE ORAL
Qty: 90 CAP | Refills: 3 | Status: SHIPPED | OUTPATIENT
Start: 2021-01-12 | End: 2022-10-14 | Stop reason: SDUPTHER

## 2021-08-03 ENCOUNTER — OFFICE VISIT (OUTPATIENT)
Dept: CARDIOLOGY CLINIC | Age: 66
End: 2021-08-03
Payer: COMMERCIAL

## 2021-08-03 VITALS
BODY MASS INDEX: 23.34 KG/M2 | DIASTOLIC BLOOD PRESSURE: 78 MMHG | SYSTOLIC BLOOD PRESSURE: 128 MMHG | WEIGHT: 163 LBS | HEIGHT: 70 IN | OXYGEN SATURATION: 99 % | HEART RATE: 72 BPM

## 2021-08-03 DIAGNOSIS — R03.0 BORDERLINE HYPERTENSION: ICD-10-CM

## 2021-08-03 DIAGNOSIS — I25.10 CORONARY ARTERY DISEASE INVOLVING NATIVE CORONARY ARTERY OF NATIVE HEART WITHOUT ANGINA PECTORIS: Primary | ICD-10-CM

## 2021-08-03 DIAGNOSIS — E78.5 DYSLIPIDEMIA: ICD-10-CM

## 2021-08-03 PROCEDURE — 99214 OFFICE O/P EST MOD 30 MIN: CPT | Performed by: INTERNAL MEDICINE

## 2021-08-03 PROCEDURE — 93000 ELECTROCARDIOGRAM COMPLETE: CPT | Performed by: INTERNAL MEDICINE

## 2021-08-03 NOTE — PROGRESS NOTES
HISTORY OF PRESENT ILLNESS  Lorenzo Michael is a 73 y.o. male. Follow-up  Pertinent negatives include no chest pain, no abdominal pain, no headaches and no shortness of breath. Patient presents for a follow-up office visit. He was previously followed by Dr. Geraldine Negrete prior to his [facility]. He has a family history of premature coronary artery disease, dyslipidemia, and nonobstructive coronary disease which was first identified by cardiac catheterization in 2013. He also underwent a coronary calcium score in 2016 with a score of 615. He has been managed with aggressive lipid-lowering therapy since that time. He last underwent a pharmacologic nuclear stress test in November 2020 which was a normal and low risk study, demonstrating no evidence of ischemia or infarction, EF 64%. The patient was last seen in the office approximately 7 months ago. Since last visit, he reports his been feeling relatively well. He admits he has not exercised as much as he was in the past but he is no longer going to the [facility]. He does try to get out and walk outside most days of the week. He still plays golf. He has not noted any major change in his activity tolerance. No exertional chest pain or shortness of breath. He will notice an occasional skipped heartbeat without associated dizziness, diaphoresis or syncope. Past Medical History:   Diagnosis Date    Abnormal stress echo 04/24/2013    Abnormal maximal stress echo. Echo findings positive for stress-induced ischemia, suggesting CAD in the LAD. EF 65%. Mod apical hypk. Neg EKG. Ex time 13 min.  Agatston CAC score, >400 07/11/2016    Coronary calcium score 615.  Aorto-iliac duplex 05/24/2013    No AAA identified. No visible plaque noted.  Dyslipidemia     S/P cardiac cath 06/28/2013    dRCA 50%. o/pPDA 50%. LM patent. CX 25%. pLAD 35%. LVEDP 14.  EF 65%. No WMA.        Current Outpatient Medications   Medication Sig Dispense Refill    atorvastatin (LIPITOR) 40 mg tablet TAKE 1 TABLET DAILY 90 Tab 3    ramipriL (ALTACE) 2.5 mg capsule TAKE 1 CAPSULE DAILY 90 Cap 3    cyclobenzaprine (FLEXERIL) 10 mg tablet Take 10 mg by mouth every eight (8) hours as needed.  metoprolol succinate (TOPROL-XL) 25 mg XL tablet TAKE 1 TABLET DAILY 90 Tab 3    ezetimibe (ZETIA) 10 mg tablet Take 1 Tab by mouth daily. 90 Tab 3    aspirin 81 mg tablet Take 81 mg by mouth daily. Allergies   Allergen Reactions    Celebrex [Celecoxib] Rash    Crestor [Rosuvastatin] Myalgia    Zocor [Simvastatin] Myalgia      Social History     Tobacco Use    Smoking status: Never Smoker    Smokeless tobacco: Former User   Substance Use Topics    Alcohol use: Yes     Comment: occassionally    Drug use: No     Family History   Problem Relation Age of Onset    Other Mother         Alhemzier's    Heart Attack Father     Heart Attack Maternal Uncle     Coronary Artery Disease Maternal Uncle     Heart Surgery Maternal Uncle         CABG         Review of Systems   Constitutional: Negative for chills, fever, malaise/fatigue and weight loss. HENT: Negative for nosebleeds. Eyes: Negative for blurred vision and double vision. Respiratory: Negative for cough, shortness of breath and wheezing. Cardiovascular: Positive for palpitations. Negative for chest pain, orthopnea, claudication, leg swelling and PND. Gastrointestinal: Negative for abdominal pain, heartburn, nausea and vomiting. Genitourinary: Negative for dysuria and hematuria. Musculoskeletal: Negative for falls and myalgias. Skin: Negative for rash. Neurological: Negative for dizziness, focal weakness and headaches. Endo/Heme/Allergies: Does not bruise/bleed easily. Psychiatric/Behavioral: Negative for memory loss and substance abuse.      Visit Vitals  /78 (BP 1 Location: Left upper arm, BP Patient Position: Sitting, BP Cuff Size: Adult)   Pulse 72   Ht 5' 10\" (1.778 m)   Wt 73.9 kg (163 lb)   SpO2 99%   BMI 23.39 kg/m²       Physical Exam  Constitutional:       Appearance: He is well-developed. HENT:      Head: Normocephalic and atraumatic. Eyes:      Conjunctiva/sclera: Conjunctivae normal.   Neck:      Vascular: No carotid bruit or JVD. Cardiovascular:      Rate and Rhythm: Normal rate and regular rhythm. Pulses: Normal pulses. Heart sounds: S1 normal and S2 normal. No murmur heard. No gallop. No S3 sounds. Pulmonary:      Breath sounds: Normal breath sounds. No wheezing or rales. Abdominal:      General: Bowel sounds are normal.      Palpations: Abdomen is soft. Tenderness: There is no abdominal tenderness. Musculoskeletal:         General: No swelling, tenderness or deformity. Cervical back: Neck supple. Skin:     General: Skin is warm and dry. Neurological:      General: No focal deficit present. Mental Status: He is alert and oriented to person, place, and time. Psychiatric:         Behavior: Behavior normal.       EKG: Normal sinus rhythm, normal axis, normal QTc interval, no ST/T wave abnormalities concerning for ischemia. No change compared to the previous tracing. ASSESSMENT and PLAN    Nonobstructive coronary disease. This was initially identified by cardiac catheterization in 2013. He was found to have no significant coronary obstructions greater than 50% in all 3 coronary vessels. This was further evaluated by a coronary calcium score in 2016 where he had an elevated score of 615. He last underwent a pharmacologic nuclear stress test November 2020 which was a normal and low risk study. He remains on aspirin, potent statin, and Zetia along with a low-dose beta-blocker and ACE inhibitor. Dyslipidemia. Patient has been managed with atorvastatin 40 mg daily along with ezetimibe 10 mg daily. His lipid panel from 2019 was excellent. He is scheduled to have a follow-up lipid profile with his PCP next month.   I would prefer his LDL to be less than 70. I requested a copy for our records once he has this done. Borderline essential hypertension. Patient continues to take a low-dose metoprolol and ACE inhibitor. His blood pressure remains well controlled on this regimen. Follow-up annually, sooner if needed.

## 2021-08-03 NOTE — PROGRESS NOTES
Chayo Sandoval presents today for   Chief Complaint   Patient presents with    Follow-up     6 month follow up        Chayo Sandoval preferred language for health care discussion is english/other. Is someone accompanying this pt? no    Is the patient using any DME equipment during [address]? no    Depression Screening:  3 most recent PHQ Screens 8/3/2021   Little interest or pleasure in doing things Not at all   Feeling down, depressed, irritable, or hopeless Not at all   Total Score PHQ 2 0       Learning Assessment:  Learning Assessment 1/5/2021   PRIMARY LEARNER Patient   HIGHEST LEVEL OF EDUCATION - PRIMARY LEARNER  -   BARRIERS PRIMARY LEARNER -   CO-LEARNER CAREGIVER -   PRIMARY LANGUAGE ENGLISH   LEARNER PREFERENCE PRIMARY DEMONSTRATION   ANSWERED BY patient   RELATIONSHIP SELF       Abuse Screening:  Abuse Screening Questionnaire 1/5/2021   Do you ever feel afraid of your partner? N   Are you in a relationship with someone who physically or mentally threatens you? N   Is it safe for you to go home? Y       Fall Risk  Fall Risk Assessment, last 12 mths 8/3/2021   Able to walk? Yes   Fall in past 12 months? 0   Do you feel unsteady? 0   Are you worried about falling 0       Pt currently taking Anticoagulant therapy? ASA 81mg every day     Coordination of Care:  1. Have you been to the ER, urgent care clinic since your last visit? Hospitalized since your last visit? no    2. Have you seen or consulted any other health care providers outside of the [address] [place] [address] since your last visit? Include any pap smears or colon screening.  no

## 2021-08-31 RX ORDER — EZETIMIBE 10 MG/1
10 TABLET ORAL DAILY
Qty: 90 TABLET | Refills: 3 | Status: SHIPPED | OUTPATIENT
Start: 2021-08-31 | End: 2022-10-14 | Stop reason: SDUPTHER

## 2022-03-19 PROBLEM — R03.0 BORDERLINE HYPERTENSION: Status: ACTIVE | Noted: 2021-08-03

## 2022-03-19 PROBLEM — Z82.49 FAMILY HISTORY OF PREMATURE CAD: Status: ACTIVE | Noted: 2021-01-05

## 2022-09-22 ENCOUNTER — OFFICE VISIT (OUTPATIENT)
Dept: CARDIOLOGY CLINIC | Age: 67
End: 2022-09-22
Payer: MEDICARE

## 2022-09-22 VITALS
HEART RATE: 66 BPM | WEIGHT: 160 LBS | HEIGHT: 70 IN | DIASTOLIC BLOOD PRESSURE: 80 MMHG | BODY MASS INDEX: 22.9 KG/M2 | OXYGEN SATURATION: 97 % | SYSTOLIC BLOOD PRESSURE: 128 MMHG

## 2022-09-22 DIAGNOSIS — R53.83 FATIGUE, UNSPECIFIED TYPE: ICD-10-CM

## 2022-09-22 DIAGNOSIS — Z82.49 FAMILY HISTORY OF PREMATURE CAD: ICD-10-CM

## 2022-09-22 DIAGNOSIS — I25.10 CORONARY ARTERY DISEASE INVOLVING NATIVE CORONARY ARTERY OF NATIVE HEART WITHOUT ANGINA PECTORIS: Primary | ICD-10-CM

## 2022-09-22 DIAGNOSIS — R03.0 BORDERLINE HYPERTENSION: ICD-10-CM

## 2022-09-22 DIAGNOSIS — E78.5 DYSLIPIDEMIA: ICD-10-CM

## 2022-09-22 PROCEDURE — 93000 ELECTROCARDIOGRAM COMPLETE: CPT | Performed by: INTERNAL MEDICINE

## 2022-09-22 PROCEDURE — G8420 CALC BMI NORM PARAMETERS: HCPCS | Performed by: INTERNAL MEDICINE

## 2022-09-22 PROCEDURE — 1123F ACP DISCUSS/DSCN MKR DOCD: CPT | Performed by: INTERNAL MEDICINE

## 2022-09-22 PROCEDURE — 1101F PT FALLS ASSESS-DOCD LE1/YR: CPT | Performed by: INTERNAL MEDICINE

## 2022-09-22 PROCEDURE — G8536 NO DOC ELDER MAL SCRN: HCPCS | Performed by: INTERNAL MEDICINE

## 2022-09-22 PROCEDURE — G8510 SCR DEP NEG, NO PLAN REQD: HCPCS | Performed by: INTERNAL MEDICINE

## 2022-09-22 PROCEDURE — 3017F COLORECTAL CA SCREEN DOC REV: CPT | Performed by: INTERNAL MEDICINE

## 2022-09-22 PROCEDURE — G8427 DOCREV CUR MEDS BY ELIG CLIN: HCPCS | Performed by: INTERNAL MEDICINE

## 2022-09-22 PROCEDURE — 99214 OFFICE O/P EST MOD 30 MIN: CPT | Performed by: INTERNAL MEDICINE

## 2022-09-22 RX ORDER — GALANTAMINE HYDROBROMIDE 8 MG/1
8 CAPSULE, EXTENDED RELEASE ORAL DAILY
COMMUNITY
Start: 2022-08-28

## 2022-09-22 NOTE — PROGRESS NOTES
Faustino Briceño presents today for   Chief Complaint   Patient presents with    Follow-up     Overdue yearly       Faustino Briceño preferred language for health care discussion is english/other. Is someone accompanying this pt? yes    Is the patient using any DME equipment during [address]? no    Depression Screening:  3 most recent PHQ Screens 9/22/2022   Little interest or pleasure in doing things Not at all   Feeling down, depressed, irritable, or hopeless Not at all   Total Score PHQ 2 0       Learning Assessment:  Learning Assessment 9/22/2022   PRIMARY LEARNER Patient   HIGHEST LEVEL OF EDUCATION - PRIMARY LEARNER  -   BARRIERS PRIMARY LEARNER -   CO-LEARNER CAREGIVER -   PRIMARY LANGUAGE ENGLISH   LEARNER PREFERENCE PRIMARY DEMONSTRATION   ANSWERED BY patient   RELATIONSHIP SELF       Abuse Screening:  Abuse Screening Questionnaire 9/22/2022   Do you ever feel afraid of your partner? N   Are you in a relationship with someone who physically or mentally threatens you? N   Is it safe for you to go home? Y       Fall Risk  Fall Risk Assessment, last 12 mths 9/22/2022   Able to walk? Yes   Fall in past 12 months? 0   Do you feel unsteady? 0   Are you worried about falling 0           Pt currently taking Anticoagulant therapy? no    Pt currently taking Antiplatelet therapy ? Aspirin 81 mg daily      Coordination of Care:  1. Have you been to the ER, urgent care clinic since your last visit? Hospitalized since your last visit? no    2. Have you seen or consulted any other health care providers outside of the [address] [place] [address] since your last visit? Include any pap smears or colon screening.  no

## 2022-09-22 NOTE — PROGRESS NOTES
HISTORY OF PRESENT ILLNESS  Karime Castaneda is a 78 y.o. male. Follow-up  Pertinent negatives include no chest pain, no abdominal pain, no headaches and no shortness of breath. Patient presents for a follow-up office visit. He was previously followed by Dr. Tarah Collazo prior to his long term. He has a family history of premature coronary artery disease, dyslipidemia, and nonobstructive coronary disease which was first identified by cardiac catheterization in 2013. He also underwent a coronary calcium score in 2016 with a score of 615. He has been managed with aggressive lipid-lowering therapy since that time. He last underwent a pharmacologic nuclear stress test in November 2020 which was a normal and low risk study, demonstrating no evidence of ischemia or infarction, EF 64%. The patient was last seen in the office approximately 13 months ago. Since last visit, he admits he has not been exercising nearly as much as he had been in the past.  He does complain of some increased fatigue and is wondering if his blood pressure medication is contributing. He has not noted any exertional chest pain or shortness of breath. No heart palpitations, dizziness, nor syncope. He stopped his beta-blocker since last visit due to fatigue, but did not notice any major improvement since stopping the medication. Past Medical History:   Diagnosis Date    Abnormal stress echo 04/24/2013    Abnormal maximal stress echo. Echo findings positive for stress-induced ischemia, suggesting CAD in the LAD. EF 65%. Mod apical hypk. Neg EKG. Ex time 13 min. Agatston CAC score, >400 07/11/2016    Coronary calcium score 615. Aorto-iliac duplex 05/24/2013    No AAA identified. No visible plaque noted. Dyslipidemia     S/P cardiac cath 06/28/2013    dRCA 50%. o/pPDA 50%. LM patent. CX 25%. pLAD 35%. LVEDP 14.  EF 65%. No WMA.        Current Outpatient Medications   Medication Sig Dispense Refill    galantamine (RAZADYNE) 8 mg SR capsule Take 8 mg by mouth daily. ezetimibe (ZETIA) 10 mg tablet Take 1 Tablet by mouth daily. 90 Tablet 3    atorvastatin (LIPITOR) 40 mg tablet TAKE 1 TABLET DAILY 90 Tab 3    ramipriL (ALTACE) 2.5 mg capsule TAKE 1 CAPSULE DAILY 90 Cap 3    aspirin 81 mg tablet Take 81 mg by mouth daily. Allergies   Allergen Reactions    Celebrex [Celecoxib] Rash    Crestor [Rosuvastatin] Myalgia    Zocor [Simvastatin] Myalgia      Social History     Tobacco Use    Smoking status: Never    Smokeless tobacco: Former     Quit date: 1/1/1985   Vaping Use    Vaping Use: Never used   Substance Use Topics    Alcohol use: Yes     Comment: occassionally    Drug use: No     Family History   Problem Relation Age of Onset    Other Mother         Alhemzier's    Heart Attack Father     Heart Attack Maternal Uncle     Coronary Art Dis Maternal Uncle     Heart Surgery Maternal Uncle         CABG         Review of Systems   Constitutional:  Positive for malaise/fatigue. Negative for chills, fever and weight loss. HENT:  Negative for nosebleeds. Eyes:  Negative for blurred vision and double vision. Respiratory:  Negative for cough, shortness of breath and wheezing. Cardiovascular:  Negative for chest pain, palpitations, orthopnea, claudication, leg swelling and PND. Gastrointestinal:  Negative for abdominal pain, heartburn, nausea and vomiting. Genitourinary:  Negative for dysuria and hematuria. Musculoskeletal:  Negative for falls and myalgias. Skin:  Negative for rash. Neurological:  Negative for dizziness, focal weakness and headaches. Endo/Heme/Allergies:  Does not bruise/bleed easily. Psychiatric/Behavioral:  Negative for memory loss and substance abuse.     Visit Vitals  /80 (BP 1 Location: Left upper arm, BP Patient Position: Sitting, BP Cuff Size: Adult)   Pulse 66   Ht 5' 10\" (1.778 m)   Wt 72.6 kg (160 lb)   SpO2 97%   BMI 22.96 kg/m²       Physical Exam  Constitutional: Appearance: He is well-developed. HENT:      Head: Normocephalic and atraumatic. Eyes:      Conjunctiva/sclera: Conjunctivae normal.   Neck:      Vascular: No carotid bruit or JVD. Cardiovascular:      Rate and Rhythm: Normal rate and regular rhythm. Pulses: Normal pulses. Heart sounds: S1 normal and S2 normal. No murmur heard. No gallop. No S3 sounds. Pulmonary:      Breath sounds: Normal breath sounds. No wheezing or rales. Abdominal:      General: Bowel sounds are normal.      Palpations: Abdomen is soft. Tenderness: There is no abdominal tenderness. Musculoskeletal:         General: No swelling, tenderness or deformity. Cervical back: Neck supple. Skin:     General: Skin is warm and dry. Neurological:      General: No focal deficit present. Mental Status: He is alert and oriented to person, place, and time. Psychiatric:         Behavior: Behavior normal.     EKG: Normal sinus rhythm, normal axis, normal QTc interval, no ST/T wave abnormalities concerning for ischemia. No change compared to the previous tracing. ASSESSMENT and PLAN    Nonobstructive coronary disease. This was initially identified by cardiac catheterization in 2013. He was found to have no significant coronary obstructions greater than 50% in all 3 coronary vessels. This was further evaluated by a coronary calcium score in 2016 where he had an elevated score of 615. He last underwent a pharmacologic nuclear stress test November 2020 which was a normal and low risk study. He is currently taking an aspirin and a potent statin. No new symptoms concerning for angina. I recommend he start exercising at least 3 to 4 days a week again. Dyslipidemia. Patient has been managed with atorvastatin 40 mg daily along with ezetimibe 10 mg daily. His most recent lipid profile from February 2022: Total cholesterol 159, triglycerides 67, HDL 59, LDL 87. These numbers are very reasonable.   This is followed annually by his PCP. Borderline essential hypertension. Patient feels that his blood pressure medication may be causing excessive fatigue. I recommended a trial off the Altace and then monitoring his blood pressure regularly for a month. If the majority of his readings are less than 130/80 he can remain off the medication. Follow-up annually, sooner if needed.

## 2022-10-14 RX ORDER — RAMIPRIL 2.5 MG/1
2.5 CAPSULE ORAL DAILY
Qty: 90 CAPSULE | Refills: 3 | Status: SHIPPED | OUTPATIENT
Start: 2022-10-14

## 2022-10-14 RX ORDER — ATORVASTATIN CALCIUM 40 MG/1
40 TABLET, FILM COATED ORAL DAILY
Qty: 90 TABLET | Refills: 3 | Status: SHIPPED | OUTPATIENT
Start: 2022-10-14

## 2022-10-14 RX ORDER — EZETIMIBE 10 MG/1
10 TABLET ORAL DAILY
Qty: 90 TABLET | Refills: 3 | Status: SHIPPED | OUTPATIENT
Start: 2022-10-14

## 2023-01-06 RX ORDER — ATORVASTATIN CALCIUM 40 MG/1
40 TABLET, FILM COATED ORAL DAILY
Qty: 90 TABLET | Refills: 3 | Status: SHIPPED | OUTPATIENT
Start: 2023-01-06

## 2023-01-06 RX ORDER — EZETIMIBE 10 MG/1
10 TABLET ORAL DAILY
Qty: 90 TABLET | Refills: 3 | Status: SHIPPED | OUTPATIENT
Start: 2023-01-06

## 2023-01-06 RX ORDER — RAMIPRIL 2.5 MG/1
2.5 CAPSULE ORAL DAILY
Qty: 90 CAPSULE | Refills: 3 | Status: SHIPPED | OUTPATIENT
Start: 2023-01-06

## 2023-11-09 ENCOUNTER — OFFICE VISIT (OUTPATIENT)
Age: 68
End: 2023-11-09

## 2023-11-09 VITALS
OXYGEN SATURATION: 97 % | DIASTOLIC BLOOD PRESSURE: 78 MMHG | HEART RATE: 81 BPM | HEIGHT: 70 IN | SYSTOLIC BLOOD PRESSURE: 128 MMHG | WEIGHT: 160 LBS | BODY MASS INDEX: 22.9 KG/M2

## 2023-11-09 DIAGNOSIS — I25.10 ATHEROSCLEROSIS OF NATIVE CORONARY ARTERY OF NATIVE HEART WITHOUT ANGINA PECTORIS: Primary | ICD-10-CM

## 2023-11-09 DIAGNOSIS — R03.0 BORDERLINE HYPERTENSION: ICD-10-CM

## 2023-11-09 DIAGNOSIS — E78.5 DYSLIPIDEMIA: ICD-10-CM

## 2023-11-09 DIAGNOSIS — Z82.49 FAMILY HISTORY OF PREMATURE CAD: ICD-10-CM

## 2023-11-09 PROBLEM — S06.341A TRAUMATIC HEMORRHAGE OF RIGHT CEREBRUM WITH LOSS OF CONSCIOUSNESS OF 30 MINUTES OR LESS (HCC): Status: ACTIVE | Noted: 2022-06-03

## 2023-11-09 PROBLEM — I10 PRIMARY HYPERTENSION: Status: ACTIVE | Noted: 2023-03-30

## 2023-11-09 RX ORDER — ASPIRIN 81 MG/1
81 TABLET, CHEWABLE ORAL DAILY
COMMUNITY

## 2023-11-09 ASSESSMENT — ENCOUNTER SYMPTOMS
NAUSEA: 0
COUGH: 0
SORE THROAT: 0
ABDOMINAL PAIN: 0
SHORTNESS OF BREATH: 0
VOMITING: 0
ABDOMINAL DISTENTION: 0

## 2023-11-09 ASSESSMENT — ANXIETY QUESTIONNAIRES
6. BECOMING EASILY ANNOYED OR IRRITABLE: 0
1. FEELING NERVOUS, ANXIOUS, OR ON EDGE: 0
5. BEING SO RESTLESS THAT IT IS HARD TO SIT STILL: 0
2. NOT BEING ABLE TO STOP OR CONTROL WORRYING: 0
GAD7 TOTAL SCORE: 0
4. TROUBLE RELAXING: 0
7. FEELING AFRAID AS IF SOMETHING AWFUL MIGHT HAPPEN: 0
3. WORRYING TOO MUCH ABOUT DIFFERENT THINGS: 0

## 2023-11-09 ASSESSMENT — PATIENT HEALTH QUESTIONNAIRE - PHQ9
SUM OF ALL RESPONSES TO PHQ QUESTIONS 1-9: 0
SUM OF ALL RESPONSES TO PHQ QUESTIONS 1-9: 0
SUM OF ALL RESPONSES TO PHQ9 QUESTIONS 1 & 2: 0
2. FEELING DOWN, DEPRESSED OR HOPELESS: 0
1. LITTLE INTEREST OR PLEASURE IN DOING THINGS: 0
SUM OF ALL RESPONSES TO PHQ QUESTIONS 1-9: 0
SUM OF ALL RESPONSES TO PHQ QUESTIONS 1-9: 0

## 2023-11-09 NOTE — PROGRESS NOTES
Eduardo Yi presents today for   Chief Complaint   Patient presents with    Follow-up     1 year       Eduardo Yi preferred language for health care discussion is english/other. Is someone accompanying this pt? no    Is the patient using any DME equipment during OV? no    Depression Screening:  Depression: Not at risk (11/9/2023)    PHQ-2     PHQ-2 Score: 0        Learning Assessment:  Who is the primary learner? Patient    What is the preferred language for health care of the primary learner? ENGLISH    How does the primary learner prefer to learn new concepts? DEMONSTRATION    Answered By patient    Relationship to Learner SELF           Pt currently taking Anticoagulant therapy? no    Pt currently taking Antiplatelet therapy ? Aspirin 81 mg daily      Coordination of Care:  1. Have you been to the ER, urgent care clinic since your last visit? Hospitalized since your last visit? no    2. Have you seen or consulted any other health care providers outside of the [address] [place] [address] since your last visit? Include any pap smears or colon screening.  no

## 2023-11-09 NOTE — PROGRESS NOTES
11/09/23     Sharri Brito  is a 80 y.o. male     Chief Complaint   Patient presents with    Follow-up     1 year       HPI  Patient presents for a follow-up office visit. He was previously followed by Dr. Olga Hadley prior to his custodial. He has a family history of premature coronary artery disease, dyslipidemia, and nonobstructive coronary disease which was first identified by cardiac catheterization in 2013. He also underwent a coronary calcium score in 2016 with a score of 615. He has been managed with aggressive lipid-lowering therapy since that time. He last underwent a pharmacologic nuclear stress test in November 2020 which was a normal and low risk study, demonstrating no evidence of ischemia or infarction, EF 64%. The patient was last seen in the office approximately 1 year ago. Since last visit, he states has been feeling well. Admits he is not exercising as much as he was a previous year but does stay fairly active is with his routine activities of daily living. He has not noted any new chest pain, shortness of breath, leg swelling or claudication. Past Medical History:   Diagnosis Date    AAA (abdominal aortic aneurysm) ([address]) 05/24/2013    No AAA identified. No visible plaque noted. Abnormal stress echo 04/24/2013    Abnormal maximal stress echo. Echo findings positive for stress-induced ischemia, suggesting CAD in the LAD. EF 65%. Mod apical hypk. Neg EKG. Ex time 13 min. Agatston CAC score, >400 07/11/2016    Coronary calcium score 615. Dyslipidemia     S/P cardiac cath 06/28/2013    dRCA 50%. o/pPDA 50%. LM patent. CX 25%. pLAD 35%. LVEDP 14.  EF 65%. No WMA.        Current Outpatient Medications   Medication Sig Dispense Refill    aspirin 81 MG chewable tablet Take 1 tablet by mouth daily      atorvastatin (LIPITOR) 40 MG tablet Take 1 tablet by mouth daily      ezetimibe (ZETIA) 10 MG tablet Take 1 tablet by mouth daily      galantamine (RAZADYNE ER) 8 MG

## 2024-11-11 ENCOUNTER — OFFICE VISIT (OUTPATIENT)
Age: 69
End: 2024-11-11

## 2024-11-11 DIAGNOSIS — R03.0 BORDERLINE HYPERTENSION: ICD-10-CM

## 2024-11-11 DIAGNOSIS — E78.5 DYSLIPIDEMIA: ICD-10-CM

## 2024-11-11 DIAGNOSIS — I25.10 ATHEROSCLEROSIS OF NATIVE CORONARY ARTERY OF NATIVE HEART WITHOUT ANGINA PECTORIS: Primary | ICD-10-CM

## 2024-11-11 DIAGNOSIS — Z82.49 FAMILY HISTORY OF PREMATURE CAD: ICD-10-CM

## 2024-11-11 PROBLEM — F03.90 MAJOR NEUROCOGNITIVE DISORDER (HCC): Status: ACTIVE | Noted: 2022-06-03

## 2024-11-11 PROBLEM — Z86.0100 HISTORY OF COLONIC POLYPS: Status: ACTIVE | Noted: 2022-08-04

## 2024-11-11 RX ORDER — MEMANTINE HYDROCHLORIDE 5 MG/1
5 TABLET ORAL DAILY
COMMUNITY
Start: 2024-11-03

## 2024-11-11 ASSESSMENT — ANXIETY QUESTIONNAIRES
5. BEING SO RESTLESS THAT IT IS HARD TO SIT STILL: NOT AT ALL
2. NOT BEING ABLE TO STOP OR CONTROL WORRYING: NOT AT ALL
3. WORRYING TOO MUCH ABOUT DIFFERENT THINGS: NOT AT ALL
7. FEELING AFRAID AS IF SOMETHING AWFUL MIGHT HAPPEN: NOT AT ALL
6. BECOMING EASILY ANNOYED OR IRRITABLE: NOT AT ALL
1. FEELING NERVOUS, ANXIOUS, OR ON EDGE: NOT AT ALL
4. TROUBLE RELAXING: NOT AT ALL

## 2024-11-11 ASSESSMENT — PATIENT HEALTH QUESTIONNAIRE - PHQ9
1. LITTLE INTEREST OR PLEASURE IN DOING THINGS: NOT AT ALL
SUM OF ALL RESPONSES TO PHQ QUESTIONS 1-9: 0
SUM OF ALL RESPONSES TO PHQ QUESTIONS 1-9: 0
SUM OF ALL RESPONSES TO PHQ9 QUESTIONS 1 & 2: 0
SUM OF ALL RESPONSES TO PHQ QUESTIONS 1-9: 0
2. FEELING DOWN, DEPRESSED OR HOPELESS: NOT AT ALL
SUM OF ALL RESPONSES TO PHQ QUESTIONS 1-9: 0

## 2024-11-12 VITALS
HEART RATE: 72 BPM | OXYGEN SATURATION: 97 % | WEIGHT: 154 LBS | HEIGHT: 70 IN | SYSTOLIC BLOOD PRESSURE: 134 MMHG | BODY MASS INDEX: 22.05 KG/M2 | DIASTOLIC BLOOD PRESSURE: 62 MMHG

## 2024-11-12 ASSESSMENT — ENCOUNTER SYMPTOMS
SHORTNESS OF BREATH: 0
VOMITING: 0
ABDOMINAL DISTENTION: 0
ABDOMINAL PAIN: 0
COUGH: 0
NAUSEA: 0
SORE THROAT: 0

## 2024-11-12 NOTE — PROGRESS NOTES
negative  Nicola Chun presents today for   Chief Complaint   Patient presents with    Follow-up     1 year       Nicola Chun preferred language for health care discussion is english/other.    Is someone accompanying this pt? yes    Is the patient using any DME equipment during OV? no    Depression Screening:  Depression: Not at risk (11/11/2024)    PHQ-2     PHQ-2 Score: 0        Learning Assessment:  Who is the primary learner? Patient    What is the preferred language for health care of the primary learner? ENGLISH    How does the primary learner prefer to learn new concepts? DEMONSTRATION    Answered By patient    Relationship to Learner SELF           Pt currently taking Anticoagulant therapy? no    Pt currently taking Antiplatelet therapy ? Aspirin 81 mg daily      Coordination of Care:  1. Have you been to the ER, urgent care clinic since your last visit? Hospitalized since your last visit? no    2. Have you seen or consulted any other health care providers outside of the [facility] since your last visit? Include any pap smears or colon screening. no    
tablet by mouth daily      galantamine (RAZADYNE ER) 8 MG extended release capsule Take 1 capsule by mouth daily       No current facility-administered medications for this visit.     Allergies   Allergen Reactions    Rosuvastatin Myalgia    Simvastatin Myalgia    Celecoxib Rash    Poison Ivy Extract Rash     Social History     Tobacco Use    Smoking status: Never    Smokeless tobacco: Former     Quit date: 1/1/1985   Vaping Use    Vaping status: Never Used   Substance Use Topics    Alcohol use: Yes    Drug use: No     Family History   Problem Relation Age of Onset    Other Mother         Alhemzier's    Heart Attack Father     No Known Problems Sister     No Known Problems Brother     No Known Problems Maternal Grandmother     No Known Problems Maternal Grandfather     No Known Problems Paternal Grandmother     No Known Problems Paternal Grandfather     Heart Surgery Maternal Uncle         CABG    Coronary Art Dis Maternal Uncle     Heart Attack Maternal Uncle     No Known Problems Other          Review of Systems   Constitutional:  Negative for chills, fatigue and fever.   HENT:  Negative for congestion, hearing loss, nosebleeds and sore throat.    Eyes:  Negative for visual disturbance.   Respiratory:  Negative for cough and shortness of breath.    Cardiovascular:  Negative for chest pain, palpitations and leg swelling.   Gastrointestinal:  Negative for abdominal distention, abdominal pain, nausea and vomiting.   Endocrine: Negative for cold intolerance and heat intolerance.   Genitourinary:  Negative for dysuria.   Musculoskeletal:  Negative for arthralgias.   Skin:  Negative for rash.   Neurological:  Negative for dizziness, syncope, weakness and headaches.   Hematological:  Does not bruise/bleed easily.   Psychiatric/Behavioral:  Negative for suicidal ideas.          /62   Pulse 72   Ht 1.778 m (5' 10\")   Wt 69.9 kg (154 lb)   SpO2 97%   BMI 22.10 kg/m²     Objective:   Physical Exam  Constitutional:

## 2024-12-10 RX ORDER — EZETIMIBE 10 MG/1
10 TABLET ORAL DAILY
Qty: 90 TABLET | Refills: 3 | Status: SHIPPED | OUTPATIENT
Start: 2024-12-10

## 2024-12-10 RX ORDER — ATORVASTATIN CALCIUM 40 MG/1
40 TABLET, FILM COATED ORAL DAILY
Qty: 90 TABLET | Refills: 3 | Status: SHIPPED | OUTPATIENT
Start: 2024-12-10

## 2024-12-10 RX ORDER — RAMIPRIL 2.5 MG/1
2.5 CAPSULE ORAL DAILY
Qty: 90 CAPSULE | Refills: 3 | Status: SHIPPED | OUTPATIENT
Start: 2024-12-10